# Patient Record
Sex: FEMALE | Race: WHITE | NOT HISPANIC OR LATINO | Employment: UNEMPLOYED | URBAN - NONMETROPOLITAN AREA
[De-identification: names, ages, dates, MRNs, and addresses within clinical notes are randomized per-mention and may not be internally consistent; named-entity substitution may affect disease eponyms.]

---
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
## 2025-05-01 ENCOUNTER — OFFICE VISIT (OUTPATIENT)
Dept: INTERNAL MEDICINE | Facility: CLINIC | Age: 20
End: 2025-05-01
Payer: MEDICAID

## 2025-05-01 VITALS
HEIGHT: 64 IN | RESPIRATION RATE: 16 BRPM | TEMPERATURE: 97.7 F | WEIGHT: 133.8 LBS | DIASTOLIC BLOOD PRESSURE: 62 MMHG | SYSTOLIC BLOOD PRESSURE: 90 MMHG | OXYGEN SATURATION: 97 % | BODY MASS INDEX: 22.84 KG/M2 | HEART RATE: 68 BPM

## 2025-05-01 DIAGNOSIS — Z00.00 ROUTINE GENERAL MEDICAL EXAMINATION AT A HEALTH CARE FACILITY: Primary | ICD-10-CM

## 2025-05-01 RX ORDER — METOPROLOL SUCCINATE 25 MG/1
12.5 TABLET, EXTENDED RELEASE ORAL DAILY
COMMUNITY
End: 2025-05-01 | Stop reason: SDUPTHER

## 2025-05-01 RX ORDER — METOPROLOL SUCCINATE 25 MG/1
25 TABLET, EXTENDED RELEASE ORAL DAILY
Start: 2025-05-01

## 2025-05-01 NOTE — PROGRESS NOTES
Subjective   Yenni Ledbetter is a 20 y.o. female.     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  She currently follows with her specialist at  cardiology Dr. Baxter, cardio oncologist Dr Rawls , rheumatologist, orthopedist for bilateral acetabular dysfunction, neurologist , she is currently on metoprolol for heart rate, she was taking lisinopril she was discontinued given her low blood pressure readings, she is starting school at [employer] ,      Past Medical History:   Diagnosis Date    Acid reflux     ADHD (attention deficit hyperactivity disorder)     ALL (acute lymphoid leukemia) in remission     at age 3 , finished chemo    Allergic     Anxiety     Bronchitis     Cancer     Migraines     Pain     Strep throat     Tachycardia     Urinary tract infection        Past Surgical History:   Procedure Laterality Date    BONE MARROW ASPIRATE AND BIOPSY WIITH LUMBAR PUNCTURE      HIP SURGERY Bilateral     Periacetabular    MEDIPORT INSERTION, SINGLE      MEDIPORT REMOVAL         Family History   Problem Relation Age of Onset    No Known Problems Mother     Diabetes Father     Migraines Sister         reports that she has never smoked. She has never used smokeless tobacco. She reports that she does not drink alcohol and does not use drugs.    Allergies   Allergen Reactions    Pentamidine Swelling    Vancomycin      Prasad            Current Outpatient Medications:     albuterol (PROVENTIL) (2.5 MG/3ML) 0.083% nebulizer solution, Take 2.5 mg by nebulization Every 4 (Four) Hours As Needed for Wheezing., Disp: 25 each, Rfl: 0    metoprolol succinate XL (TOPROL-XL) 25 MG 24 hr  "tablet, Take 1 tablet by mouth Daily., Disp: , Rfl:       Objective   Blood pressure 90/62, pulse 68, temperature 97.7 °F (36.5 °C), temperature source Temporal, resp. rate 16, height 162.6 cm (64\"), weight 60.7 kg (133 lb 12.8 oz), SpO2 97%, not currently breastfeeding.    Physical Exam  Vitals and nursing note reviewed.   Constitutional:       General: She is not in acute distress.     Appearance: Normal appearance. She is not diaphoretic.   HENT:      Head: Normocephalic and atraumatic.      Right Ear: External ear normal.      Left Ear: External ear normal.      Nose: Nose normal.   Eyes:      Extraocular Movements: Extraocular movements intact.      Conjunctiva/sclera: Conjunctivae normal.   Neck:      Trachea: Trachea normal.   Cardiovascular:      Rate and Rhythm: Normal rate and regular rhythm.      Heart sounds: Normal heart sounds.   Pulmonary:      Effort: Pulmonary effort is normal. No respiratory distress.      Breath sounds: Normal breath sounds.   Abdominal:      General: Abdomen is flat.   Musculoskeletal:      Cervical back: Neck supple.      Comments: Moves all limbs   Skin:     General: Skin is warm.   Neurological:      Mental Status: She is alert and oriented to person, place, and time.      Comments: No gross motor or sensory deficits         BMI is within normal parameters. No other follow-up for BMI required.      Results for orders placed or performed during the hospital encounter of 02/01/25   POC Rapid Strep A    Collection Time: 02/01/25  2:58 PM    Specimen: Swab   Result Value Ref Range    Rapid Strep A Screen Positive (A)     Internal Control Passed     Lot Number [id number]     Expiration Date 12-    Covid-19 + Flu A&B AG, Veritor    Collection Time: 02/01/25  3:09 PM    Specimen: Swab   Result Value Ref Range    SARS Antigen Not Detected Not Detected, Presumptive Negative    Influenza A Antigen CLAUDY Detected (A) Not Detected    Influenza B Antigen CLAUDY Not Detected Not Detected "    Internal Control Passed Passed    Lot Number [id number]     Expiration Date 11-          Assessment & Plan   Diagnoses and all orders for this visit:    1. Routine general medical examination at a health care facility (Primary)  -     CBC (No Diff)  -     Comprehensive Metabolic Panel  -     Lipid Panel  -     Hemoglobin A1c  -     TSH  -     Vitamin B12    Other orders  -     metoprolol succinate XL (TOPROL-XL) 25 MG 24 hr tablet; Take 1 tablet by mouth Daily.    plan:  1.physical: Physical exam conducted today,  Will obtain fasting lab work, given low blood pressure readings advised to take metoprolol at half dose daily and follow-up with cardiologist  Impression: healthy adult Patient. Currently, patient eats a healthy diet. Screening lab work includes glucose, lipid profile , complete blood count and comprehensive panel . The risks and benefits of immunizations were discussed and immunizations are up to date. Advice and education were given regarding nutrition and aerobic exercise. Patient discussion: discussed with the patient.  Physical with preventive exam as well as age and risk appropriate counseling completed.   Patient Discussion: plan discussed with the patient, follow-up visit needed in one year. Medication Review and medication list updated   Advised  regular weight-bearing exercise               Dyan Mart MD

## 2025-05-09 ENCOUNTER — PATIENT ROUNDING (BHMG ONLY) (OUTPATIENT)
Dept: INTERNAL MEDICINE | Facility: CLINIC | Age: 20
End: 2025-05-09
Payer: MEDICAID

## 2025-05-09 NOTE — PROGRESS NOTES
A [employer] message has been sent to patient for PATIENT ROUNDING with [facility].

## 2025-06-05 ENCOUNTER — TELEPHONE (OUTPATIENT)
Dept: INTERNAL MEDICINE | Facility: CLINIC | Age: 20
End: 2025-06-05
Payer: MEDICAID

## 2025-06-05 NOTE — TELEPHONE ENCOUNTER
Caller: Sammi Ledbetter    Relationship: Mother    Best call back number: [phone]    What is the best time to reach you: ANY    Who are you requesting to speak with (clinical staff, provider,  specific staff member):     CLINICAL    What was the call regarding:     MOM THINKS PATIENT IS DUE FOR A VACCINE.  CAN WE TELL WHAT IT IS?  PLEASE CALL PATIENT'S MOM

## 2025-06-05 NOTE — TELEPHONE ENCOUNTER
Left detailed voicemail for mom per signed DOROTHEA that patient was due for HPV and meningococcal b vaccines and these could be given at the health dept or the patients pharmacy

## 2025-06-26 LAB
ALBUMIN SERPL-MCNC: 4.5 G/DL (ref 3.5–5.2)
ALBUMIN/GLOB SERPL: 1.7 G/DL
ALP SERPL-CCNC: 129 U/L (ref 39–117)
ALT SERPL-CCNC: 9 U/L (ref 1–33)
AST SERPL-CCNC: 18 U/L (ref 1–32)
BILIRUB SERPL-MCNC: <0.2 MG/DL (ref 0–1.2)
BUN SERPL-MCNC: 9 MG/DL (ref 6–20)
BUN/CREAT SERPL: 15.5 (ref 7–25)
CALCIUM SERPL-MCNC: 10.1 MG/DL (ref 8.6–10.5)
CHLORIDE SERPL-SCNC: 104 MMOL/L (ref 98–107)
CHOLEST SERPL-MCNC: 185 MG/DL (ref 0–200)
CO2 SERPL-SCNC: 24.3 MMOL/L (ref 22–29)
CREAT SERPL-MCNC: 0.58 MG/DL (ref 0.57–1)
EGFRCR SERPLBLD CKD-EPI 2021: 133.1 ML/MIN/1.73
ERYTHROCYTE [DISTWIDTH] IN BLOOD BY AUTOMATED COUNT: 14.1 % (ref 12.3–15.4)
GLOBULIN SER CALC-MCNC: 2.6 GM/DL
GLUCOSE SERPL-MCNC: 82 MG/DL (ref 65–99)
HBA1C MFR BLD: 5.3 % (ref 4.8–5.6)
HCT VFR BLD AUTO: 44 % (ref 34–46.6)
HDLC SERPL-MCNC: 41 MG/DL (ref 40–60)
HGB BLD-MCNC: 13.7 G/DL (ref 12–15.9)
LDLC SERPL CALC-MCNC: 119 MG/DL (ref 0–100)
MCH RBC QN AUTO: 27.2 PG (ref 26.6–33)
MCHC RBC AUTO-ENTMCNC: 31.1 G/DL (ref 31.5–35.7)
MCV RBC AUTO: 87.5 FL (ref 79–97)
PLATELET # BLD AUTO: 371 10*3/MM3 (ref 140–450)
POTASSIUM SERPL-SCNC: 4.5 MMOL/L (ref 3.5–5.2)
PROT SERPL-MCNC: 7.1 G/DL (ref 6–8.5)
RBC # BLD AUTO: 5.03 10*6/MM3 (ref 3.77–5.28)
SODIUM SERPL-SCNC: 141 MMOL/L (ref 136–145)
TRIGL SERPL-MCNC: 141 MG/DL (ref 0–150)
TSH SERPL DL<=0.005 MIU/L-ACNC: 3.85 UIU/ML (ref 0.27–4.2)
VIT B12 SERPL-MCNC: 461 PG/ML (ref 211–946)
VLDLC SERPL CALC-MCNC: 25 MG/DL (ref 5–40)
WBC # BLD AUTO: 4.82 10*3/MM3 (ref 3.4–10.8)